# Patient Record
Sex: MALE | Race: BLACK OR AFRICAN AMERICAN | NOT HISPANIC OR LATINO | Employment: FULL TIME | URBAN - METROPOLITAN AREA
[De-identification: names, ages, dates, MRNs, and addresses within clinical notes are randomized per-mention and may not be internally consistent; named-entity substitution may affect disease eponyms.]

---

## 2017-03-27 ENCOUNTER — OFFICE VISIT (OUTPATIENT)
Dept: INTERNAL MEDICINE | Facility: CLINIC | Age: 32
End: 2017-03-27
Payer: OTHER GOVERNMENT

## 2017-03-27 VITALS
HEIGHT: 75 IN | BODY MASS INDEX: 30.59 KG/M2 | HEART RATE: 48 BPM | DIASTOLIC BLOOD PRESSURE: 82 MMHG | SYSTOLIC BLOOD PRESSURE: 130 MMHG | OXYGEN SATURATION: 99 % | WEIGHT: 246.06 LBS | TEMPERATURE: 98 F

## 2017-03-27 DIAGNOSIS — G47.33 OSA ON CPAP: ICD-10-CM

## 2017-03-27 DIAGNOSIS — H51.8 DYSCONJUGATE GAZE: ICD-10-CM

## 2017-03-27 DIAGNOSIS — R53.82 CHRONIC FATIGUE: ICD-10-CM

## 2017-03-27 DIAGNOSIS — Z00.00 ROUTINE MEDICAL EXAM: Primary | ICD-10-CM

## 2017-03-27 PROCEDURE — 99385 PREV VISIT NEW AGE 18-39: CPT | Mod: S$PBB,,, | Performed by: INTERNAL MEDICINE

## 2017-03-27 PROCEDURE — 99999 PR PBB SHADOW E&M-NEW PATIENT-LVL IV: CPT | Mod: PBBFAC,,, | Performed by: INTERNAL MEDICINE

## 2017-03-27 PROCEDURE — 99204 OFFICE O/P NEW MOD 45 MIN: CPT | Mod: PBBFAC | Performed by: INTERNAL MEDICINE

## 2017-03-27 RX ORDER — SILDENAFIL 100 MG/1
100 TABLET, FILM COATED ORAL DAILY PRN
COMMUNITY

## 2017-03-27 RX ORDER — CELECOXIB 100 MG/1
100 CAPSULE ORAL 2 TIMES DAILY
COMMUNITY

## 2017-03-27 RX ORDER — VALACYCLOVIR HYDROCHLORIDE 500 MG/1
500 TABLET, FILM COATED ORAL 2 TIMES DAILY
COMMUNITY

## 2017-03-27 NOTE — MR AVS SNAPSHOT
"    [facility]  [address]  [place] [address]  Phone: [phone]  Fax: [phone]                  Omar Garza   3/27/2017 8:30 AM   Office Visit    Description:  Male : 1985   Provider:  Angela Jesus MD   Department:  [facility]           Reason for Visit     Establish Care           Diagnoses this Visit        Comments    Routine medical exam    -  Primary     Dysconjugate gaze         KANDIS on CPAP         Chronic fatigue                To Do List           Goals (5 Years of Data)     None      Follow-Up and Disposition     Return if symptoms worsen or fail to improve.      [facility]     [facility] Care [facility] -  Assistance  Registered nurses in the [facility] provide clinical advisement, health education, appointment booking, and other advisory services.  Call for this free service at [phone].             Medications                Verify that the below list of medications is an accurate representation of the medications you are currently taking.  If none reported, the list may be blank. If incorrect, please contact your healthcare provider. Carry this list with you in case of emergency.           Current Medications     celecoxib (CELEBREX) 100 MG capsule Take 100 mg by mouth 2 (two) times daily.    sildenafil (VIAGRA) 100 MG tablet Take 100 mg by mouth daily as needed for Erectile Dysfunction.    valacyclovir (VALTREX) 500 MG tablet Take 500 mg by mouth 2 (two) times daily.           Clinical Reference Information           Your Vitals Were     BP Pulse Temp Height Weight SpO2    130/82 (BP Location: Right arm, Patient Position: Sitting, BP Method: Manual) 48 97.5 °F (36.4 °C) (Oral) 6' 3" (1.905 m) 111.6 kg (246 lb 0.5 oz) 99%    BMI                30.75 kg/m2          Blood Pressure          Most Recent Value    BP  130/82      Allergies as of 3/27/2017     No Known Allergies      Immunizations " Administered on Date of Encounter - 3/27/2017     None      Orders Placed During Today's Visit      Normal Orders This Visit    Ambulatory consult to Sleep Disorders     Ambulatory Referral to Optometry       [facility] Sign-Up     Activating your [facility] account is as easy as 1-2-3!     1) Visit [email or web address], select Sign Up Now, enter this activation code and your date of birth, then select Next.  [email or web address]  Expires: 5/11/2017  9:39 AM      2) Create a username and password to use when you visit [facility] in the future and select a security question in case you lose your password and select Next.    3) Enter your e-mail address and click Sign Up!    Additional Information  If you have questions, please e-mail [email or web address] or call [phone] to talk to our [facility] staff. Remember, [facility] is NOT to be used for urgent needs. For medical emergencies, dial 911.         Language Assistance Services     ATTENTION: Language assistance services are available, free of charge. Please call [phone].      ATENCIÓN: Si habla español, tiene a palmer disposición servicios gratuitos de asistencia lingüística. Llame al [phone].     CHÚ Ý: N?u b?n nói Ti?ng Vi?t, có các d?ch v? h? tr? ngôn ng? mi?n phí dành cho b?n. G?i s? [phone].         [facility] complies with applicable Federal civil rights laws and does not discriminate on the basis of race, color, national origin, age, disability, or sex.

## 2017-03-28 PROBLEM — M25.9 SHOULDER PROBLEM: Status: ACTIVE | Noted: 2017-03-28

## 2017-03-28 NOTE — PROGRESS NOTES
"Subjective:       Patient ID: Omar Garza is a 32 y.o. male.    Chief Complaint: Establish Care    HPI Comments: [facility] is new to the clinic[facility] Presents to establish care, to make sure [facility] is [facility], but didn't bring any records. Fairly uncomplicated case, no diagnostic dilemmas. Just a variety of musculoskeletal problems stemming from ten years of service in the [employer]. No acute complaints.     PM/SH:   KANDIS on CPAP.   Bilateral Shoulder problems s/p surgery on right shoulder twice "failed".   Right Carpal Tunnel Syndrome.   Bilateral Cubital Tunnel Syndrome.   Right 4th Finger Fracture, no surgery required.   Erectile Dysfunction with history of groin injury.  Eye Surgery as a child.   Severe Tinea Pedis treated by Podiatry.   H/O Morbid Obesity, max weight 310 lbs.  Oral Herpes Simplex.   Eye exam 2016. Flu shot 2016. Labs 1/17 including Cholesterol.     Social: Non-smoker, alcohol about one drink per month.  8 years, 3 children. Just retired from the [employer] 12/16, currently unemployed. High school graduate.     FMH: HTN in mother. HTN, DM and Heart dis in grandparents. Breast cancer in maternal aunt. Lung cancer in maternal uncle.     NKDA.    Medications: Celebrex 100mg BID. Valtrex 500mg daily. Viagra 100mg prn. TENS unit. Multivitamin.       Review of Systems   Constitutional: Positive for fatigue. Negative for activity change, appetite change, chills, fever and unexpected weight change.        Compliant with nightly use of CPAP and still has chronic fatigue with daytime somnolence. Exercises regularly, runs 8-10 miles five days a week. Minimal caffeine, one soda daily.    HENT: Negative for congestion, ear pain, hearing loss, postnasal drip, rhinorrhea, sore throat, trouble swallowing and voice change.    Eyes: Negative for pain, itching and visual disturbance.        Dysconjugate gaze since childhood.    Respiratory: Positive for apnea. Negative for cough, " "chest tightness, shortness of breath and wheezing.    Cardiovascular: Negative for chest pain, palpitations and leg swelling.   Gastrointestinal: Negative for abdominal pain, blood in stool, constipation, diarrhea, nausea and vomiting.   Genitourinary: Negative for difficulty urinating, dysuria, frequency, hematuria, scrotal swelling and urgency.   Musculoskeletal: Positive for arthralgias. Negative for back pain, gait problem, myalgias, neck pain and neck stiffness.        Joint pain in shoulders, elbows, knees.    Skin: Negative for color change.        Complains of acne on body, [facility]    Neurological: Positive for numbness. Negative for dizziness, seizures, syncope, weakness and headaches.   Hematological: Negative for adenopathy. Does not bruise/bleed easily.   Psychiatric/Behavioral: Negative for agitation, decreased concentration, dysphoric mood and suicidal ideas. The patient is not nervous/anxious.        Objective:    /82, Pulse 48-60, Ht 6' 3", Wt 246 lbs, BMI=30.75.  Physical Exam   Constitutional: He is oriented to person, place, and time. He appears well-developed and well-nourished. No distress.   HENT:   Head: Normocephalic and atraumatic.   Right Ear: External ear normal.   Left Ear: External ear normal.   Nose: Nose normal.   Mouth/Throat: Oropharynx is clear and moist.   Eyes: Conjunctivae are normal. Pupils are equal, round, and reactive to light. No scleral icterus.   Eyes move together but right eye seems to drift laterally at rest.    Neck: Normal range of motion. Neck supple. No JVD present. No thyromegaly present.   Cardiovascular: Normal rate, regular rhythm, normal heart sounds and intact distal pulses.  Exam reveals no gallop and no friction rub.    No murmur heard.  Pulmonary/Chest: Effort normal and breath sounds normal. No respiratory distress. He has no wheezes. He has no rales.   Abdominal: Soft. Bowel sounds are normal. He exhibits no " distension and no mass. There is no tenderness. No hernia.   Musculoskeletal: He exhibits no edema or tenderness.   Right shoulder has limited range of motion. Right 4th finger healed crooked.    Lymphadenopathy:     He has no cervical adenopathy.   Neurological: He is alert and oriented to person, place, and time. No cranial nerve deficit. He exhibits normal muscle tone. Coordination normal.   Skin: Skin is warm and dry. No rash noted. He is not diaphoretic.   Psychiatric: He has a normal mood and affect. His behavior is normal. Thought content normal.       Assessment:       1. Routine medical exam    2. Dysconjugate gaze    3. KANDIS on CPAP    4. Chronic fatigue        Plan:       Routine medical exam    Dysconjugate gaze  -     Ambulatory Referral to Optometry    KANDIS on CPAP  -     Ambulatory consult to Sleep Disorders    Chronic fatigue  -     Ambulatory consult to Sleep Disorders    Various musculoskeletal problems, patient is not requesting Ortho consult here[facility]   Requested to [facility] prior to repeating any here.   Return as needed.

## 2017-03-29 ENCOUNTER — PATIENT MESSAGE (OUTPATIENT)
Dept: INTERNAL MEDICINE | Facility: CLINIC | Age: 32
End: 2017-03-29

## 2017-04-03 ENCOUNTER — OFFICE VISIT (OUTPATIENT)
Dept: OPTOMETRY | Facility: CLINIC | Age: 32
End: 2017-04-03
Payer: OTHER GOVERNMENT

## 2017-04-03 DIAGNOSIS — H52.13 BILATERAL MYOPIA: ICD-10-CM

## 2017-04-03 DIAGNOSIS — H50.111 EXOTROPIA OF RIGHT EYE: Primary | ICD-10-CM

## 2017-04-03 PROCEDURE — 99999 PR PBB SHADOW E&M-EST. PATIENT-LVL III: CPT | Mod: PBBFAC,,, | Performed by: OPTOMETRIST

## 2017-04-03 PROCEDURE — 92004 COMPRE OPH EXAM NEW PT 1/>: CPT | Mod: S$PBB,,, | Performed by: OPTOMETRIST

## 2017-04-03 PROCEDURE — 99213 OFFICE O/P EST LOW 20 MIN: CPT | Mod: PBBFAC | Performed by: OPTOMETRIST

## 2017-04-03 PROCEDURE — 92015 DETERMINE REFRACTIVE STATE: CPT | Mod: ,,, | Performed by: OPTOMETRIST

## 2017-04-03 NOTE — PATIENT INSTRUCTIONS
EXOTROPIA    Exotropia is the outward deviation (turn) of an eye. The deviation may occur while fixating (looking at) distance objects, near objects or both. Fortunately, most exotropia is intermittent (see pictures below -- straight and deviated) and this means that the eye deviation or turn occurs only some of the time. As long as the eyes are straight some of the time, the brain will develop some normal functioning of the eyes (stereoscopic depth perception). Since the brain and eyes work properly some of the time, time is on your side.     Treatment for intermittent exotropia does not have to occur immediately. As a matter of fact, early surgery has the potential of disturbing the ability of the brain for fusion in the future and can cause a permanent reduction in vision (amblyopia).     When the turn occurs during distance viewing the major problem is cosmetic. The child might be accused of daydreaming or not paying attention. The parents will often notice the turn then bring their child to the eye doctor who won't find it. This is because the turn usually occurs during times of inattention, fatigue, or distance viewing not during the anxiety-provoking eye examination. These children often close their eye in bright sunlight.     Treatment consists of vision therapy, patching, glasses and/or surgery. The most successful form of treatment is vision therapy. Therapy should be directed at the cause. In a comparative study using both Optometric and Ophthalmological journals, vision therapy had an overall success rate of 78% as compared to surgery of 48%. Thus, surgery should be reserved only for the large angle intermittent exotropes or when vision therapy is not as successful as expected.     Published success rates are higher for vision therapy than surgery. Many surgeons do not advocate vision therapy either because they are surgeons and thus like to operate and/or they do not have the facilities to perform  vision therapy, and/or have not read the medical literature on vision therapy and unaware of its proven success rates in the treatment of exotropia.       When the turn occurs primarily at near (convergence insufficiency) the principal symptoms include diplopia, headaches, loss of concentration while reading, carsickness, avoidance of reading, blurred vision, and/or eyestrain. This is the most common type of type of muscle problem occurring in approximately 5% of the population. Symptoms are on the rise with increase reading and computer use.     Prismatic glasses can be prescribed to decrease some of the symptoms. Though prisms are effective they are not as effective as vision therapy and may result in adaptation problems so that more prism is necessary in the future to alleviate symptoms. Every Optometric and Ophthalmological textbook agrees that the primary treatment of convergence insufficiency should be vision therapy.    Lina Mead, OD

## 2017-04-03 NOTE — PROGRESS NOTES
HPI     Mr. Omar Garza  referred by Angela Jesus MD for dysconjugate   gaze     Patient states that he has had strabismus since a child and was interested   in possibly getting eye muscle surgery to correct it, pt c/o blurry   distance vision in OD without glasses.    (-)drops  (-)flashes  (+)floaters OU has had since a child no recent changes in his floaters   (-)diplopia    Diabetic no  No results found for: HGBA1C    OCULAR HISTORY  [facility]   (+)eye surgery OS pt could not specify what type of surgery but state it   was a form of corrective surgery to the cornea at about age 8   + strabismus     FAMILY HISTORY  (-)Glaucoma none          Last edited by Lina Mead, OD on 4/3/2017 10:36 AM.         Assessment /Plan     For exam results, see Encounter Report.    Exotropia of right eye   No amblyopia. Pt motivated for strabismus surgery. Consult scheduled with surgeon on 04/25/17.    Bilateral myopia   New glasses prescription released, adaptation expected.    Eyeglass Final Rx     Eyeglass Final Rx      Sphere Cylinder   Right -1.00 Sphere   Left -0.25 Sphere       Type:  SVL    Expiration Date:  4/4/2018                 RTC 04/25/17 for strabismus surgery consult

## 2017-04-03 NOTE — LETTER
April 3, 2017      Angela Jesus MD  [address]  [facility] [address]           [facility]  [address]  [facility] [address]  Phone: [phone]          Patient: Omar Garza   MR Number: [id number]   YOB: 1985   Date of Visit: 4/3/2017       Dear Dr. Angela Jesus:    Thank you for referring Omar Garza to me for evaluation. Attached you will find relevant portions of my assessment and plan of care.    If you have questions, please do not hesitate to call me. I look forward to following Omar Garza along with you.    Sincerely,    Lina Mead, OD    Enclosure  CC:  No Recipients    If you would like to receive this communication electronically, please contact [email or web address] or [phone] to request more information on [employer] Link access.    For providers and/or their staff who would like to refer a patient to [facility], please contact us through our one-stop-shop provider referral line, [facility], at [phone].    If you feel you have received this communication in error or would no longer like to receive these types of communications, please e-mail [email or web address]

## 2017-04-03 NOTE — MR AVS SNAPSHOT
[facility]  [address]  [facility] [address]  Phone: [phone]                  Omar Garza   4/3/2017 10:00 AM   Office Visit    Description:  Male : 1985   Provider:  Lina Mead OD   Department:  [facility]           Reason for Visit     Eye Exam           Diagnoses this Visit        Comments    Exotropia of right eye    -  Primary     Bilateral myopia                To Do List           Future Appointments        Provider Department Dept Phone    2017 10:00 AM Dwayne Metz MD [facility] [phone]      Goals (5 Years of Data)     None      [facility]     [facility] On Call Nurse Care Line -  Assistance  Unless otherwise directed by your provider, please contact [facility], our nurse care line that is available for  assistance.     Registered nurses in the [facility] On Call Center provide: appointment scheduling, clinical advisement, health education, and other advisory services.  Call: [phone] (toll free)               Medications                Verify that the below list of medications is an accurate representation of the medications you are currently taking.  If none reported, the list may be blank. If incorrect, please contact your healthcare provider. Carry this list with you in case of emergency.           Current Medications     celecoxib (CELEBREX) 100 MG capsule Take 100 mg by mouth 2 (two) times daily.    sildenafil (VIAGRA) 100 MG tablet Take 100 mg by mouth daily as needed for Erectile Dysfunction.    valacyclovir (VALTREX) 500 MG tablet Take 500 mg by mouth 2 (two) times daily.           Clinical Reference Information           Allergies as of 4/3/2017     No Known Allergies      Immunizations Administered on Date of Encounter - 4/3/2017     None      Instructions    EXOTROPIA    Exotropia is the outward deviation (turn) of an eye. The deviation may occur while fixating (looking at) distance  objects, near objects or both. Fortunately, most exotropia is intermittent (see pictures below -- straight and deviated) and this means that the eye deviation or turn occurs only some of the time. As long as the eyes are straight some of the time, the brain will develop some normal functioning of the eyes (stereoscopic depth perception). Since the brain and eyes work properly some of the time, time is on your side.     Treatment for intermittent exotropia does not have to occur immediately. As a matter of fact, early surgery has the potential of disturbing the ability of the brain for fusion in the future and can cause a permanent reduction in vision (amblyopia).     When the turn occurs during distance viewing the major problem is cosmetic. The child might be accused of daydreaming or not paying attention. The parents will often notice the turn then bring their child to the eye doctor who won't find it. This is because the turn usually occurs during times of inattention, fatigue, or distance viewing not during the anxiety-provoking eye examination. These children often close their eye in bright sunlight.     Treatment consists of vision therapy, patching, glasses and/or surgery. The most successful form of treatment is vision therapy. Therapy should be directed at the cause. In a comparative study using both Optometric and Ophthalmological journals, vision therapy had an overall success rate of 78% as compared to surgery of 48%. Thus, surgery should be reserved only for the large angle intermittent exotropes or when vision therapy is not as successful as expected.     Published success rates are higher for vision therapy than surgery. Many surgeons do not advocate vision therapy either because they are surgeons and thus like to operate and/or they do not have the facilities to perform vision therapy, and/or have not read the medical literature on vision therapy and unaware of its proven success rates in the  treatment of exotropia.       When the turn occurs primarily at near (convergence insufficiency) the principal symptoms include diplopia, headaches, loss of concentration while reading, carsickness, avoidance of reading, blurred vision, and/or eyestrain. This is the most common type of type of muscle problem occurring in approximately 5% of the population. Symptoms are on the rise with increase reading and computer use.     Prismatic glasses can be prescribed to decrease some of the symptoms. Though prisms are effective they are not as effective as vision therapy and may result in adaptation problems so that more prism is necessary in the future to alleviate symptoms. Every Optometric and Ophthalmological textbook agrees that the primary treatment of convergence insufficiency should be vision therapy.    Lina Mead OD        Language Assistance Services     ATTENTION: Language assistance services are available, free of charge. Please call [phone].      ATENCIÓN: Si mickey robertson, tiene a palmer disposición servicios gratuitos de asistencia lingüística. Llame al [phone].     SARAH Ý: N?u b?n nói Ti?ng Vi?t, có các d?ch v? h? tr? ngôn ng? mi?n phí dành cho b?n. G?i s? [phone].         [facility] complies with applicable Federal civil rights laws and does not discriminate on the basis of race, color, national origin, age, disability, or sex.

## 2017-04-25 ENCOUNTER — INITIAL CONSULT (OUTPATIENT)
Dept: OPHTHALMOLOGY | Facility: CLINIC | Age: 32
End: 2017-04-25
Payer: OTHER GOVERNMENT

## 2017-04-25 DIAGNOSIS — H50.10 EXOTROPIA: Primary | ICD-10-CM

## 2017-04-25 DIAGNOSIS — H50.22 HYPERTROPIA OF LEFT EYE: ICD-10-CM

## 2017-04-25 PROCEDURE — 99212 OFFICE O/P EST SF 10 MIN: CPT | Mod: PBBFAC | Performed by: OPHTHALMOLOGY

## 2017-04-25 PROCEDURE — 92060 SENSORIMOTOR EXAMINATION: CPT | Mod: 26,S$PBB,, | Performed by: OPHTHALMOLOGY

## 2017-04-25 PROCEDURE — 92060 SENSORIMOTOR EXAMINATION: CPT | Mod: PBBFAC | Performed by: OPHTHALMOLOGY

## 2017-04-25 PROCEDURE — 99999 PR PBB SHADOW E&M-EST. PATIENT-LVL II: CPT | Mod: PBBFAC,,, | Performed by: OPHTHALMOLOGY

## 2017-04-25 PROCEDURE — 92002 INTRM OPH EXAM NEW PATIENT: CPT | Mod: S$PBB,,, | Performed by: OPHTHALMOLOGY

## 2017-04-25 NOTE — LETTER
April 25, 2017      Lina Mead, OD  [address]  [facility] [address]           [facility]  [address]  [facility] [address]  Phone: [phone]  Fax: [phone]          Patient: Omar Garza   MR Number: [id number]   YOB: 1985   Date of Visit: 4/25/2017       Dear Dr. Lina Mead:    Thank you for referring Omar Garza to me for evaluation. Attached you will find relevant portions of my assessment and plan of care.    If you have questions, please do not hesitate to call me. I look forward to following Omar Garza along with you.    Sincerely,    BERNADETTE Capone Jr., MD    Enclosure  CC:  No Recipients    If you would like to receive this communication electronically, please contact [email or web address] or [phone] to request more information on [employer] Link access.    For providers and/or their staff who would like to refer a patient to [facility], please contact us through our one-stop-shop provider referral line, [facility], at [phone].    If you feel you have received this communication in error or would no longer like to receive these types of communications, please e-mail [email or web address]

## 2017-04-25 NOTE — PROGRESS NOTES
HPI     31 Y/O M presents today for Strabismus Eval. Pt has a h/o of CE due to   having to restore sight in the left eye at age 7 or 8. Today pt report OD>   OS and my focusing of my right to be worse. stj     POHx:   1. H/O Eye Sx OS (Cataract)        Last edited by Daksha Loving MA on 4/25/2017 11:32 AM.     ROS     Positive for: Eyes    Last edited by BERNADETTE Capone Jr., MD on 4/25/2017 11:44 AM. (History)          Assessment /Plan     For exam results, see Encounter Report.    Exotropia    Hypertropia of left eye      Consider surgical correction to correct exotropia and left hypertropia. The details of the surgical procedure were discussed. The risks of the procedure were identified and explained. Treatment alternatives were listed.    Advised 95% success rate with 5% chance need to repeat surgery, use of adjustable suture explained to ensure a better out come.  Procedure plan will be R&R OS w/IO recession

## 2017-04-25 NOTE — MR AVS SNAPSHOT
[facility]  [address]  [facility] [address]  Phone: [phone]  Fax: [phone]                  Omar Garza   2017 11:30 AM   Initial consult    Description:  Male : 1985   Provider:  BERNADETTE Capone Jr., MD   Department:  [facility]           Reason for Visit     Strabismus Consult           Diagnoses this Visit        Comments    Exotropia    -  Primary     Hypertropia of left eye                To Do List           Future Appointments        Provider Department Dept Phone    2017 10:00 AM Dwayne Metz MD [facility] [phone]      Goals (5 Years of Data)     None      [facility]     [facility] Nurse Care Line -  Assistance  Unless otherwise directed by your provider, please contact [facility], our nurse care line that is available for  assistance.     Registered nurses in the [facility] Center provide: appointment scheduling, clinical advisement, health education, and other advisory services.  Call: [phone] (toll free)               Medications                Verify that the below list of medications is an accurate representation of the medications you are currently taking.  If none reported, the list may be blank. If incorrect, please contact your healthcare provider. Carry this list with you in case of emergency.           Current Medications     celecoxib (CELEBREX) 100 MG capsule Take 100 mg by mouth 2 (two) times daily.    sildenafil (VIAGRA) 100 MG tablet Take 100 mg by mouth daily as needed for Erectile Dysfunction.    valacyclovir (VALTREX) 500 MG tablet Take 500 mg by mouth 2 (two) times daily.           Clinical Reference Information           Allergies as of 2017     No Known Allergies      Immunizations Administered on Date of Encounter - 2017     None      Language Assistance Services     ATTENTION: Language assistance services are available, free of charge. Please call  [phone].      ATENCIÓN: Si habla español, tiene a palmer disposición servicios gratuitos de asistencia lingüística. Llame al [phone].     CHÚ Ý: N?u b?n nói Ti?ng Vi?t, có các d?ch v? h? tr? ngôn ng? mi?n phí dành cho b?n. G?i s? [phone].         [facility] complies with applicable Federal civil rights laws and does not discriminate on the basis of race, color, national origin, age, disability, or sex.

## 2017-04-27 ENCOUNTER — TELEPHONE (OUTPATIENT)
Dept: OPHTHALMOLOGY | Facility: CLINIC | Age: 32
End: 2017-04-27

## 2017-04-27 DIAGNOSIS — H50.10 EXOTROPIA: Primary | ICD-10-CM

## 2017-04-27 DIAGNOSIS — H50.22 HYPERTROPIA OF LEFT EYE: ICD-10-CM

## 2017-05-11 ENCOUNTER — TELEPHONE (OUTPATIENT)
Dept: OPHTHALMOLOGY | Facility: CLINIC | Age: 32
End: 2017-05-11

## 2017-05-11 NOTE — TELEPHONE ENCOUNTER
----- Message from Daksha Loving MA sent at 5/10/2017  4:08 PM CDT -----  Contact: Pt  Liliam Patterson, I did speak with pt regarding Sx. Please call him to verify clearance is good to go. Thanks Daksha  ----- Message -----     From: Zahida Shelley     Sent: 5/10/2017   3:50 PM       To: [facility] Staff    Pt would like to know if his primary care doctor has signed the pre-approval letter for his upcoming surgery. He can be reached at [phone]      Phoned pt. Advised I do not have medical clearance from his PCP. Pt will call office.  [facility]

## 2017-05-12 ENCOUNTER — TELEPHONE (OUTPATIENT)
Dept: INTERNAL MEDICINE | Facility: CLINIC | Age: 32
End: 2017-05-12

## 2017-05-12 NOTE — TELEPHONE ENCOUNTER
----- Message from Domenica Harris sent at 5/11/2017  4:01 PM CDT -----  Contact: Self/ [phone]  Patient is calling to check status of the paper work left in the office clearing him for surgery. Please call and advise.             Thank you!!!

## 2017-05-12 NOTE — TELEPHONE ENCOUNTER
Seen for a physical six weeks ago with no major medical problems and normal exam. Cleared for low risk eye surgery, no additional pre-op testing needed.

## 2017-05-12 NOTE — TELEPHONE ENCOUNTER
Please advise can pt be cleared by chart for surgical correction of the eyes pl adv.   Consider surgical correction to correct exotropia and left hypertropia.

## 2017-05-16 ENCOUNTER — TELEPHONE (OUTPATIENT)
Dept: OPHTHALMOLOGY | Facility: CLINIC | Age: 32
End: 2017-05-16

## 2017-05-16 ENCOUNTER — ANESTHESIA EVENT (OUTPATIENT)
Dept: SURGERY | Facility: HOSPITAL | Age: 32
End: 2017-05-16
Payer: OTHER GOVERNMENT

## 2017-05-16 NOTE — ANESTHESIA PREPROCEDURE EVALUATION
05/16/2017  Pre-operative evaluation for Procedure(s) (LRB):  STRABISMUS REPAIR/with adjustable sutures (Bilateral)    Omar Garza is a 32 y.o. male here for the above procedure.        Patient Active Problem List   Diagnosis    KANDIS on CPAP    Carpal tunnel syndrome on right    Cubital tunnel syndrome, bilateral    Shoulder problem    Exotropia    Hypertropia of left eye       Review of patient's allergies indicates:  No Known Allergies     No current facility-administered medications on file prior to encounter.      Current Outpatient Prescriptions on File Prior to Encounter   Medication Sig Dispense Refill    celecoxib (CELEBREX) 100 MG capsule Take 100 mg by mouth 2 (two) times daily.      sildenafil (VIAGRA) 100 MG tablet Take 100 mg by mouth daily as needed for Erectile Dysfunction.      valacyclovir (VALTREX) 500 MG tablet Take 500 mg by mouth 2 (two) times daily.         Past Surgical History:   Procedure Laterality Date    CATARACT EXTRACTION      EYE SURGERY      SHOULDER SURGERY Right        Social History     Social History    Marital status:      Spouse name: N/A    Number of children: 3    Years of education: N/A     Occupational History    Not on file.     Social History Main Topics    Smoking status: Never Smoker    Smokeless tobacco: Not on file    Alcohol use Yes      Comment: One a month.     Drug use: No    Sexual activity: Not on file     Other Topics Concern    Not on file     Social History Narrative    No narrative on file         Vital Signs Range (Last 24H):         CBC: No results for input(s): WBC, RBC, HGB, HCT, PLT, MCV, MCH, MCHC in the last 72 hours.    CMP: No results for input(s): NA, K, CL, CO2, BUN, CREATININE, GLU, MG, PHOS, CALCIUM, ALBUMIN, PROT, ALKPHOS, ALT, AST, BILITOT in the last 72 hours.    INR  No results for input(s): INR, PROTIME,  APTT in the last 72 hours.    Invalid input(s): PT      Anesthesia Evaluation    I have reviewed the Patient Summary Reports.    I have reviewed the Nursing Notes.   I have reviewed the Medications.     Review of Systems  Anesthesia Hx:  No problems with previous Anesthesia  History of prior surgery of interest to airway management or planning: Denies Family Hx of Anesthesia complications.   Denies Personal Hx of Anesthesia complications.   Hematology/Oncology:  Hematology Normal   Oncology Normal     EENT/Dental:EENT/Dental Normal   Renal/:  Renal/ Normal     Hepatic/GI:  Hepatic/GI Normal    Endocrine:  Endocrine Normal    Dermatological:  Skin Normal    Psych:  Psychiatric Normal           Physical Exam  General:  Well nourished    Airway/Jaw/Neck:  Airway Findings: Mouth Opening: Normal Tongue: Normal  General Airway Assessment: Adult  Mallampati: II  Improves to I with phonation.  TM Distance: Normal, at least 6 cm     Eyes/Ears/Nose:  EYES/EARS/NOSE FINDINGS: Normal   Dental:  DENTAL FINDINGS: Normal   Chest/Lungs:  Chest/Lungs Clear    Heart/Vascular:  Heart Findings: Normal       Mental Status:  Mental Status Findings: Normal        Anesthesia Plan  Type of Anesthesia, risks & benefits discussed:  Anesthesia Type:  general  Patient's Preference:   Intra-op Monitoring Plan: standard ASA monitors  Intra-op Monitoring Plan Comments:   Post Op Pain Control Plan:   Post Op Pain Control Plan Comments:   Induction:   IV  Beta Blocker:  Patient is not currently on a Beta-Blocker (No further documentation required).       Informed Consent: Patient understands risks and agrees with Anesthesia plan.  Questions answered. Anesthesia consent signed with patient.  ASA Score: 2     Day of Surgery Review of History & Physical:            Ready For Surgery From Anesthesia Perspective.

## 2017-05-16 NOTE — BRIEF OP NOTE
Brief Operative Note  Ophthalmology Service      Date of Procedure: (Not on file)     Attending Physician: BERNADETTE Capone Jr., MD     Assistant: NAZ Mendes MD    Pre-Operative Diagnosis: Exotropia [H50.10]  Hypertropia of left eye [H50.22]     Post-Operative Diagnosis: Same as pre-operative diagnosis    Treatments/Procedures: explore LR OUj; Biased resection/advance MR 4-6 mm; MARK myectomy    Intraoperative Findings: nl EOM's    Anesthesia: General    Complications: None    Estimated Blood Loss: < 5 cc    Specimens: None    -------------------------------------------------------------  Full dictated Operative Report to follow.  -------------------------------------------------------------

## 2017-05-17 ENCOUNTER — ANESTHESIA (OUTPATIENT)
Dept: SURGERY | Facility: HOSPITAL | Age: 32
End: 2017-05-17
Payer: OTHER GOVERNMENT

## 2017-05-17 ENCOUNTER — HOSPITAL ENCOUNTER (OUTPATIENT)
Facility: HOSPITAL | Age: 32
Discharge: HOME | End: 2017-05-17
Attending: OPHTHALMOLOGY | Admitting: OPHTHALMOLOGY
Payer: OTHER GOVERNMENT

## 2017-05-17 VITALS
BODY MASS INDEX: 29.84 KG/M2 | OXYGEN SATURATION: 100 % | HEART RATE: 45 BPM | SYSTOLIC BLOOD PRESSURE: 110 MMHG | RESPIRATION RATE: 18 BRPM | TEMPERATURE: 98 F | DIASTOLIC BLOOD PRESSURE: 62 MMHG | WEIGHT: 240 LBS | HEIGHT: 75 IN

## 2017-05-17 DIAGNOSIS — H50.22 HYPERTROPIA OF LEFT EYE: ICD-10-CM

## 2017-05-17 DIAGNOSIS — H50.10 EXOTROPIA: ICD-10-CM

## 2017-05-17 DIAGNOSIS — H50.9 STRABISMUS: ICD-10-CM

## 2017-05-17 PROCEDURE — 25000003 PHARM REV CODE 250: Performed by: OPHTHALMOLOGY

## 2017-05-17 PROCEDURE — 67335 EYE SUTURE DURING SURGERY: CPT | Mod: LT,,, | Performed by: OPHTHALMOLOGY

## 2017-05-17 PROCEDURE — 67314 REVISE EYE MUSCLE: CPT | Mod: LT,,, | Performed by: OPHTHALMOLOGY

## 2017-05-17 PROCEDURE — 25000003 PHARM REV CODE 250: Performed by: ANESTHESIOLOGY

## 2017-05-17 PROCEDURE — 36000706: Performed by: OPHTHALMOLOGY

## 2017-05-17 PROCEDURE — 67332 REREVISE EYE MUSCLES ADD-ON: CPT | Mod: 50,,, | Performed by: OPHTHALMOLOGY

## 2017-05-17 PROCEDURE — D9220A PRA ANESTHESIA: Mod: ,,, | Performed by: ANESTHESIOLOGY

## 2017-05-17 PROCEDURE — 37000008 HC ANESTHESIA 1ST 15 MINUTES: Performed by: OPHTHALMOLOGY

## 2017-05-17 PROCEDURE — 63600175 PHARM REV CODE 636 W HCPCS: Performed by: ANESTHESIOLOGY

## 2017-05-17 PROCEDURE — 71000039 HC RECOVERY, EACH ADD'L HOUR: Performed by: OPHTHALMOLOGY

## 2017-05-17 PROCEDURE — 71000015 HC POSTOP RECOV 1ST HR: Performed by: OPHTHALMOLOGY

## 2017-05-17 PROCEDURE — 37000009 HC ANESTHESIA EA ADD 15 MINS: Performed by: OPHTHALMOLOGY

## 2017-05-17 PROCEDURE — 67311 REVISE EYE MUSCLE: CPT | Mod: 51,50,, | Performed by: OPHTHALMOLOGY

## 2017-05-17 PROCEDURE — 36000707: Performed by: OPHTHALMOLOGY

## 2017-05-17 PROCEDURE — 27200651 HC AIRWAY, LMA: Performed by: ANESTHESIOLOGY

## 2017-05-17 PROCEDURE — 71000033 HC RECOVERY, INTIAL HOUR: Performed by: OPHTHALMOLOGY

## 2017-05-17 RX ORDER — ONDANSETRON 2 MG/ML
4 INJECTION INTRAMUSCULAR; INTRAVENOUS ONCE
Status: COMPLETED | OUTPATIENT
Start: 2017-05-17 | End: 2017-05-17

## 2017-05-17 RX ORDER — HYDROCODONE BITARTRATE AND ACETAMINOPHEN 7.5; 325 MG/1; MG/1
1 TABLET ORAL EVERY 6 HOURS PRN
Qty: 10 TABLET | Refills: 0 | Status: SHIPPED | OUTPATIENT
Start: 2017-05-17

## 2017-05-17 RX ORDER — DEXAMETHASONE SODIUM PHOSPHATE 4 MG/ML
4 INJECTION, SOLUTION INTRA-ARTICULAR; INTRALESIONAL; INTRAMUSCULAR; INTRAVENOUS; SOFT TISSUE EVERY 6 HOURS
Status: DISCONTINUED | OUTPATIENT
Start: 2017-05-17 | End: 2017-05-17 | Stop reason: HOSPADM

## 2017-05-17 RX ORDER — LIDOCAINE HCL/PF 100 MG/5ML
SYRINGE (ML) INTRAVENOUS
Status: DISCONTINUED | OUTPATIENT
Start: 2017-05-17 | End: 2017-05-17

## 2017-05-17 RX ORDER — NEOMYCIN SULFATE, POLYMYXIN B SULFATE, AND DEXAMETHASONE 3.5; 10000; 1 MG/G; [USP'U]/G; MG/G
OINTMENT OPHTHALMIC
Status: DISCONTINUED
Start: 2017-05-17 | End: 2017-05-17 | Stop reason: HOSPADM

## 2017-05-17 RX ORDER — HYDROCODONE BITARTRATE AND ACETAMINOPHEN 5; 325 MG/1; MG/1
1 TABLET ORAL ONCE
Status: COMPLETED | OUTPATIENT
Start: 2017-05-17 | End: 2017-05-17

## 2017-05-17 RX ORDER — SODIUM CHLORIDE 9 MG/ML
INJECTION, SOLUTION INTRAVENOUS CONTINUOUS
Status: DISCONTINUED | OUTPATIENT
Start: 2017-05-17 | End: 2017-05-17 | Stop reason: HOSPADM

## 2017-05-17 RX ORDER — FENTANYL CITRATE 50 UG/ML
INJECTION, SOLUTION INTRAMUSCULAR; INTRAVENOUS
Status: DISCONTINUED | OUTPATIENT
Start: 2017-05-17 | End: 2017-05-17

## 2017-05-17 RX ORDER — ONDANSETRON 4 MG/1
4 TABLET, ORALLY DISINTEGRATING ORAL EVERY 6 HOURS PRN
Status: DISCONTINUED | OUTPATIENT
Start: 2017-05-17 | End: 2017-05-17 | Stop reason: HOSPADM

## 2017-05-17 RX ORDER — LIDOCAINE HYDROCHLORIDE 10 MG/ML
1 INJECTION, SOLUTION EPIDURAL; INFILTRATION; INTRACAUDAL; PERINEURAL ONCE
Status: COMPLETED | OUTPATIENT
Start: 2017-05-17 | End: 2017-05-17

## 2017-05-17 RX ORDER — NEOMYCIN SULFATE, POLYMYXIN B SULFATE, AND DEXAMETHASONE 3.5; 10000; 1 MG/G; [USP'U]/G; MG/G
OINTMENT OPHTHALMIC
Status: DISCONTINUED | OUTPATIENT
Start: 2017-05-17 | End: 2017-05-17 | Stop reason: HOSPADM

## 2017-05-17 RX ORDER — GLYCOPYRROLATE 0.2 MG/ML
INJECTION INTRAMUSCULAR; INTRAVENOUS
Status: DISCONTINUED | OUTPATIENT
Start: 2017-05-17 | End: 2017-05-17

## 2017-05-17 RX ORDER — HYDROCODONE BITARTRATE AND ACETAMINOPHEN 5; 325 MG/1; MG/1
1 TABLET ORAL EVERY 6 HOURS PRN
Status: DISCONTINUED | OUTPATIENT
Start: 2017-05-17 | End: 2017-05-17 | Stop reason: HOSPADM

## 2017-05-17 RX ORDER — PHENYLEPHRINE HYDROCHLORIDE 25 MG/ML
SOLUTION/ DROPS OPHTHALMIC
Status: DISCONTINUED
Start: 2017-05-17 | End: 2017-05-17 | Stop reason: HOSPADM

## 2017-05-17 RX ORDER — MIDAZOLAM HYDROCHLORIDE 1 MG/ML
INJECTION, SOLUTION INTRAMUSCULAR; INTRAVENOUS
Status: DISCONTINUED | OUTPATIENT
Start: 2017-05-17 | End: 2017-05-17

## 2017-05-17 RX ORDER — PHENYLEPHRINE HYDROCHLORIDE 25 MG/ML
SOLUTION/ DROPS OPHTHALMIC
Status: DISCONTINUED | OUTPATIENT
Start: 2017-05-17 | End: 2017-05-17 | Stop reason: HOSPADM

## 2017-05-17 RX ORDER — PROPOFOL 10 MG/ML
INJECTION, EMULSION INTRAVENOUS
Status: DISCONTINUED | OUTPATIENT
Start: 2017-05-17 | End: 2017-05-17

## 2017-05-17 RX ADMIN — LIDOCAINE HYDROCHLORIDE 1 MG: 10 INJECTION, SOLUTION EPIDURAL; INFILTRATION; INTRACAUDAL; PERINEURAL at 07:05

## 2017-05-17 RX ADMIN — GLYCOPYRROLATE 0.2 MG: 0.2 INJECTION, SOLUTION INTRAMUSCULAR; INTRAVENOUS at 08:05

## 2017-05-17 RX ADMIN — SODIUM CHLORIDE: 0.9 INJECTION, SOLUTION INTRAVENOUS at 07:05

## 2017-05-17 RX ADMIN — FENTANYL CITRATE 100 MCG: 50 INJECTION, SOLUTION INTRAMUSCULAR; INTRAVENOUS at 08:05

## 2017-05-17 RX ADMIN — DEXAMETHASONE SODIUM PHOSPHATE 4 MG: 4 INJECTION, SOLUTION INTRAMUSCULAR; INTRAVENOUS at 10:05

## 2017-05-17 RX ADMIN — MIDAZOLAM HYDROCHLORIDE 2 MG: 1 INJECTION, SOLUTION INTRAMUSCULAR; INTRAVENOUS at 07:05

## 2017-05-17 RX ADMIN — LIDOCAINE HYDROCHLORIDE 100 MG: 20 INJECTION, SOLUTION INTRAVENOUS at 08:05

## 2017-05-17 RX ADMIN — HYDROCODONE BITARTRATE AND ACETAMINOPHEN 1 TABLET: 5; 325 TABLET ORAL at 09:05

## 2017-05-17 RX ADMIN — ONDANSETRON 4 MG: 2 INJECTION INTRAMUSCULAR; INTRAVENOUS at 10:05

## 2017-05-17 RX ADMIN — PROPOFOL 200 MG: 10 INJECTION, EMULSION INTRAVENOUS at 08:05

## 2017-05-17 NOTE — ANESTHESIA RELEASE NOTE
"Anesthesia Release from PACU Note    Patient: Omar Garza    Procedure(s) Performed: Procedure(s) (LRB):  STRABISMUS REPAIR/with adjustable sutures (Bilateral)    Anesthesia type: general    Post pain: Adequate analgesia    Post assessment: no apparent anesthetic complications    Last Vitals:   Visit Vitals    /62    Pulse (!) 45    Temp 36.7 °C (98.1 °F)    Resp 18    Ht 6' 3" (1.905 m)    Wt 108.9 kg (240 lb)    SpO2 100%    BMI 30 kg/m2       Post vital signs: stable    Level of consciousness: awake    Nausea/Vomiting: no nausea/no vomiting    Complications: none    Airway Patency: patent    Respiratory: unassisted    Cardiovascular: stable    Hydration: euvolemic  "

## 2017-05-17 NOTE — DISCHARGE INSTRUCTIONS
ACTIVITY LEVEL:  If you received sedation or an anesthetic, you may feel sleepy for several hours. Rest until you are more awake. Gradually resume your normal activities in two days. Children may return to school in 2-3 days. It is all right to watch television or to read. Swimming is permitted in two weeks.    CARE OF INCISION:  A blood-tinged discharge from the eye is normal. This can be gently washed away with a clean, damp wash cloth. Do not use water, gauze, or cotton to wipe the lids. The morning after surgery, you may have difficulty opening your eyes. This is normal. If dry blood or secretions are holding the lids together, you may open the eyes by gently  the lids from above and below. Please wash your hands thoroughly before doing this. If the lids dont open, do not force them apart. (A child will cry and the tears will soften the secretions and a parent can try again later.) Use cool compresses to the eyes for 24 hours, if tolerated for comfort. Do not place any medication in the eye unless otherwise instructed.    BATHING:  Keep your face out of water for five days after surgery - NO SHOWERS.    DIET:  At home, continue with liquids, and if there is no nausea, you may eat a soft diet. Gradually resume your  normal diet.    PAIN:  If needed for discomfort, you can use cold compresses and take Tylenol (usual recommended dose) every four  hours. Generally, do not take Tylenol more than four times a day.    WHEN TO CALL THE DOCTOR:   Any increase in the amount of swelling of the eyes and adjacent tissues   Heavy yellow discharge from the eyes   Fever over 101ºF (38.4ºC)    A purple discoloration of the lower lids is common. It appears a few days after surgery and does not affect healing. You may experience double vision after surgery. This is normal and will disappear in a few days or weeks. Prescription glasses may be worn unless otherwise instructed. The eyes may be unusually sensitive to  light for several days. Dark sunglasses will help.    FOR EMERGENCIES:  If any unusual problems or difficulties occur, contact Dr. Capone or the resident at [phone] (page ) or at the Clinic office, [phone].

## 2017-05-17 NOTE — DISCHARGE SUMMARY
Discharge Summary  Ophthalmology Service      Admit Date: 5/17/2017    Discharge Date: 5/17/2017     Attending Physician: BERNADETTE Capone Jr., MD     Discharge Physician: NAZ Mendes MD    Discharged Condition: Good    Reason for Admission: Exotropia [H50.10]  Hypertropia of left eye [H50.22]     Treatments/Procedures: Strabismus Surgery (see dictated report for details).    Hospital Course: Stable, dictated    Consults: None    Significant Diagnostic Studies: None    Disposition: Home    Patient Instructions:   - Resume same diet as prior to surgery  - Resume activity as tolerated with no swimming for 1 week  - Apply ice packs to surgical eye(s) for 72 hours as tolerated  - Call the Ophthalmology clinic to schedule an appointment with Dr. Capone in 4 week(s).    Patient Instructions:   Current Discharge Medication List      CONTINUE these medications which have NOT CHANGED    Details   celecoxib (CELEBREX) 100 MG capsule Take 100 mg by mouth 2 (two) times daily.      valacyclovir (VALTREX) 500 MG tablet Take 500 mg by mouth 2 (two) times daily.      sildenafil (VIAGRA) 100 MG tablet Take 100 mg by mouth daily as needed for Erectile Dysfunction.             No discharge procedures on file.

## 2017-05-17 NOTE — TRANSFER OF CARE
"Anesthesia Transfer of Care Note    Patient: Omar Garza    Procedure(s) Performed: Procedure(s) (LRB):  STRABISMUS REPAIR/with adjustable sutures (Bilateral)    Patient location: PACU    Anesthesia Type: general    Transport from OR: Transported from OR on room air with adequate spontaneous ventilation    Post pain: adequate analgesia    Post assessment: no apparent anesthetic complications    Post vital signs: stable    Level of consciousness: awake and alert    Nausea/Vomiting: no nausea/vomiting    Complications: none          Last vitals:   Visit Vitals    /77 (BP Location: Left arm, Patient Position: Lying, BP Method: Automatic)    Pulse (!) 57    Temp 37 °C (98.6 °F) (Temporal)    Resp 16    Ht 6' 3" (1.905 m)    Wt 108.9 kg (240 lb)    SpO2 100%    BMI 30 kg/m2     "

## 2017-05-17 NOTE — IP AVS SNAPSHOT
[facility]  [address]  [facility] [address]  Phone: [phone]           Patient Discharge Instructions   Our goal is to set you up for success. This packet includes information on your condition, medications, and your home care.  It will help you care for yourself to prevent having to return to the hospital.     Please ask your nurse if you have any questions.      There are many details to remember when preparing to leave the hospital. Here is what you will need to do:    1. Take your medicine. If you are prescribed medications, review your Medication List on the following pages. You may have new medications to  at the pharmacy and others that you'll need to stop taking. Review the instructions for how and when to take your medications. Talk with your doctor or nurses if you are unsure of what to do.     2. Go to your follow-up appointments. Specific follow-up information is listed in the following pages. Your may be contacted by a nurse or clinical provider about future appointments. Be sure we have all of the phone numbers to reach you. Please contact your provider's office if you are unable to make an appointment.     3. Watch for warning signs. Your doctor or nurse will give you detailed warning signs to watch for and when to call for assistance. These instructions may also include educational information about your condition. If you experience any of warning signs to your health, call your doctor.           [facility]  Unless otherwise directed by your provider, please   contact [facility], our nurse care line   that is available for 24/7 assistance.     [phone] (toll-free)     Registered nurses in the [facility]   provide: appointment scheduling, clinical advisement, health education, and other advisory services.                  ** Verify the list of medication(s) below is accurate and up to date. Carry this with you in case of  emergency. If your medications have changed, please notify your healthcare provider.             Medication List      CONTINUE taking these medications        Additional Info                      celecoxib 100 MG capsule   Commonly known as:  CeleBREX   Refills:  0   Dose:  100 mg    Instructions:  Take 100 mg by mouth 2 (two) times daily.     Begin Date    AM    Noon    PM    Bedtime       sildenafil 100 MG tablet   Commonly known as:  VIAGRA   Refills:  0   Dose:  100 mg    Instructions:  Take 100 mg by mouth daily as needed for Erectile Dysfunction.     Begin Date    AM    Noon    PM    Bedtime       valacyclovir 500 MG tablet   Commonly known as:  VALTREX   Refills:  0   Dose:  500 mg    Instructions:  Take 500 mg by mouth 2 (two) times daily.     Begin Date    AM    Noon    PM    Bedtime                  Please bring to all follow up appointments:    1. A copy of your discharge instructions.  2. All medicines you are currently taking in their original bottles.  3. Identification and insurance card.    Please arrive 15 minutes ahead of scheduled appointment time.    Please call 24 hours in advance if you must reschedule your appointment and/or time.        Follow-up Information     Follow up with DUANE Capone Jr, MD In 4 weeks.    Specialties:  Ophthalmology, Pediatric Ophthalmology    Contact information:    [address] [facility] [address]  [phone]          Discharge Instructions     Future Orders    Activity as tolerated     Call MD for:  difficulty breathing or increased cough     Call MD for:  increased confusion or weakness     Call MD for:  persistent dizziness, light-headedness, or visual disturbances     Call MD for:  persistent nausea and vomiting or diarrhea     Call MD for:  redness, tenderness, or signs of infection (pain, swelling, redness, odor or green/yellow discharge around incision site)     Call MD for:  severe persistent headache     Call MD for:  severe uncontrolled pain      Call MD for:  temperature >100.4     Call MD for:  worsening rash     Diet general     Questions:    Total calories:      Fat restriction, if any:      Protein restriction, if any:      Na restriction, if any:      Fluid restriction:      Additional restrictions:      No dressing needed         Discharge Instructions       ACTIVITY LEVEL:  If you received sedation or an anesthetic, you may feel sleepy for several hours. Rest until you are more awake. Gradually resume your normal activities in two days. Children may return to school in 2-3 days. It is all right to watch television or to read. Swimming is permitted in two weeks.    CARE OF INCISION:  A blood-tinged discharge from the eye is normal. This can be gently washed away with a clean, damp wash cloth. Do not use water, gauze, or cotton to wipe the lids. The morning after surgery, you may have difficulty opening your eyes. This is normal. If dry blood or secretions are holding the lids together, you may open the eyes by gently  the lids from above and below. Please wash your hands thoroughly before doing this. If the lids dont open, do not force them apart. (A child will cry and the tears will soften the secretions and a parent can try again later.) Use cool compresses to the eyes for 24 hours, if tolerated for comfort. Do not place any medication in the eye unless otherwise instructed.    BATHING:  Keep your face out of water for five days after surgery - NO SHOWERS.    DIET:  At home, continue with liquids, and if there is no nausea, you may eat a soft diet. Gradually resume your  normal diet.    PAIN:  If needed for discomfort, you can use cold compresses and take Tylenol (usual recommended dose) every four  hours. Generally, do not take Tylenol more than four times a day.    WHEN TO CALL THE DOCTOR:   Any increase in the amount of swelling of the eyes and adjacent tissues   Heavy yellow discharge from the eyes   Fever over 101ºF  "(38.4ºC)    A purple discoloration of the lower lids is common. It appears a few days after surgery and does not affect healing. You may experience double vision after surgery. This is normal and will disappear in a few days or weeks. Prescription glasses may be worn unless otherwise instructed. The eyes may be unusually sensitive to light for several days. Dark sunglasses will help.    FOR EMERGENCIES:  If any unusual problems or difficulties occur, contact Dr. Capone or the resident at [phone] (page ) or at the Clinic office, [phone].      Admission Information     Date & Time Provider Department CSN    5/17/2017  7:03 AM BERNADETTE Capone Jr., MD [facility] [id number]      Care Providers     Provider Role Specialty Primary office phone    BERNADETTE Capone Jr., MD Attending Provider Ophthalmology [phone]    BERNADETTE Capone Jr., MD Surgeon  Ophthalmology [phone]      Your Vitals Were     BP Pulse Temp Resp Height Weight    126/71 51 98.1 °F (36.7 °C) (Temporal) 16 6' 3" (1.905 m) 108.9 kg (240 lb)    SpO2 BMI             100% 30 kg/m2         Recent Lab Values     No lab values to display.      Allergies as of 5/17/2017     No Known Allergies      Advance Directives     An advance directive is a document which, in the event you are no longer able to make decisions for yourself, tells your healthcare team what kind of treatment you do or do not want to receive, or who you would like to make those decisions for you.  If you do not currently have an advance directive, [facility] encourages you to create one.  For more information call:  [phone]), [phone] ([phone]),  or log on to [email or web address].        Language Assistance Services     ATTENTION: Language assistance services are available, free of charge. Please call [phone].      ATENCIÓN: Si habla español, tiene a palmer disposición servicios gratuitos de asistencia " lingüística. David al [phone].     SARAH Ý: N?u b?n nói Ti?ng Vi?t, có các d?ch v? h? tr? ngôn ng? mi?n phí dành cho b?n. G?i s? [phone].         [facility] complies with applicable Federal civil rights laws and does not discriminate on the basis of race, color, national origin, age, disability, or sex.

## 2017-05-17 NOTE — ANESTHESIA POSTPROCEDURE EVALUATION
"Anesthesia Post Evaluation    Patient: Omar Garza    Procedure(s) Performed: Procedure(s) (LRB):  STRABISMUS REPAIR/with adjustable sutures (Bilateral)    Final Anesthesia Type: general  Patient location during evaluation: PACU  Patient participation: Yes- Able to Participate  Level of consciousness: awake and alert  Post-procedure vital signs: reviewed and stable  Pain management: adequate  Airway patency: patent  PONV status at discharge: vomiting (controlled)  Anesthetic complications: no      Cardiovascular status: blood pressure returned to baseline  Respiratory status: unassisted  Hydration status: euvolemic  Follow-up not needed.        Visit Vitals    /62    Pulse (!) 45    Temp 36.7 °C (98.1 °F)    Resp 18    Ht 6' 3" (1.905 m)    Wt 108.9 kg (240 lb)    SpO2 100%    BMI 30 kg/m2       Pain/Sabina Score: Pain Assessment Performed: Yes (5/17/2017 11:32 AM)  Presence of Pain: denies (5/17/2017 11:32 AM)  Pain Rating Prior to Med Admin: 8 (5/17/2017  9:42 AM)  Sabina Score: 10 (5/17/2017 11:32 AM)      "

## 2017-05-17 NOTE — PLAN OF CARE
Pt and family given dc instructions and verbalized understanding.   0930- Pt still c/o pain to asif eyes. 8/10 on pain scale. Per Dr. Capone may get another hydrocodone/acetaminophen 5/325mg. Pt aaox3. Spouse at bedside.   1013- Notified Dr. Stanley, pt c/o nz. See new orders per Dr. Stanley, anesthesia. Pt aaox3. Spouse at bedside.   1028- Dr. Capone at bedside. No adjustment needed.   1135- Dr. Stanley, at bedside. Pt resting. Pt states nz subsided but not good enough to go hm. Per Dr. Stanley, let pt rest.

## 2017-05-17 NOTE — H&P
Pre-Operative History & Physical  Ophthalmology      SUBJECTIVE:     History of Present Illness:  Patient is a 32 y.o. male presents with Exotropia [H50.10]  Hypertropia of left eye [H50.22].    MEDICATIONS:   No prescriptions prior to admission.       ALLERGIES: Review of patient's allergies indicates:  No Known Allergies    PAST MEDICAL HISTORY:   Past Medical History:   Diagnosis Date    Carpal tunnel syndrome on right     Cataract     Cubital tunnel syndrome, bilateral     Erectile dysfunction     KANDIS on CPAP     Shoulder problem     Bilateral    Strabismus      PAST SURGICAL HISTORY:   Past Surgical History:   Procedure Laterality Date    CATARACT EXTRACTION      EYE SURGERY      SHOULDER SURGERY Right      PAST FAMILY HISTORY:   Family History   Problem Relation Age of Onset    Hypertension Mother     Breast cancer Maternal Aunt     Lung cancer Maternal Uncle     Hypertension Maternal Grandmother     Diabetes Maternal Grandmother     Hypertension Maternal Grandfather     Diabetes Maternal Grandfather     Heart disease Paternal Grandmother      SOCIAL HISTORY:   Social History   Substance Use Topics    Smoking status: Never Smoker    Smokeless tobacco: Not on file    Alcohol use Yes      Comment: One a month.         MENTAL STATUS: Alert    REVIEW OF SYSTEMS: Negative    OBJECTIVE:     Vital Signs (Most Recent)       Physical Exam:  General: NAD  HEENT: Strabismus, Atraumatic  Lungs: Adequate respirations  Heart: + pulses  Abdomen: Soft    ASSESSMENT/PLAN:     Patient is a 32 y.o. male with Exotropia [H50.10]  Hypertropia of left eye [H50.22].     - Plan for surgical correction    - Risks/benefits/alternatives of the procedure including, but not limited to scarring, bleeding, infection, loss or decreased vision, and/or need for possible repeat surgery discussed with the patient and family.   - Informed consent obtained prior to surgery and the patient/family voiced good understanding.

## 2017-05-17 NOTE — ANESTHESIA RELEASE NOTE
"Anesthesia Release from PACU Note    Patient: mOar Garza    Procedure(s) Performed: Procedure(s) (LRB):  STRABISMUS REPAIR/with adjustable sutures (Bilateral)    Anesthesia type: general    Post pain: Adequate analgesia    Post assessment: no apparent anesthetic complications    Last Vitals:   Visit Vitals    /69    Pulse (!) 55    Temp 36.7 °C (98.1 °F) (Temporal)    Resp 16    Ht 6' 3" (1.905 m)    Wt 108.9 kg (240 lb)    SpO2 99%    BMI 30 kg/m2       Post vital signs: stable    Level of consciousness: awake    Nausea/Vomiting: no nausea/no vomiting    Complications: none    Airway Patency: patent    Respiratory: unassisted           Cardiovascular: stable    Hydration: euvolemic  "

## 2017-05-17 NOTE — OP NOTE
DATE OF PROCEDURE:  05/17/2017.    SURGEON:  BERNADETTE Capone M.D.    ASSISTANT:  Valeriy Mendes M.D. (RES).    PREOPERATIVE DIAGNOSIS:  Strabismus - esotropia with left hypertropia;   reoperation.    POSTOPERATIVE DIAGNOSIS:  Strabismus - esotropia with left hypertropia;   reoperation.    PROCEDURES PERFORMED:  Exploration, lateral rectus, both eyes; biased resection   and advancement, medial rectus, both eyes, 4-6 mm; inferior oblique myectomy,   left eye.    COMPLICATIONS:  None.    ESTIMATED BLOOD LOSS:  Less than 2 mL.    PROCEDURE IN DETAIL:  The patient was brought to the Operating Suite where   general intubation anesthesia was achieved.  Both eyes were prepped and draped   in sterile fashion, lid speculum placed in the left eye.  Through an inferior   temporal fornix incision, the inferior oblique muscle was found and placed on a   muscle hook.  It was found to have been recessed.  The muscle was cleared of its   check ligaments and the scarring between the conjunctiva and the sclera was   sharply dissected.  The muscle was then disinserted from the globe and a 6 mm   myectomy was performed.  Through the same incision, the lateral rectus muscle   was identified and found to have been recessed to 15 mm from the limbus.  The   decision was made not to perform a lateral rectus recession.  Attention was   directed to the right eye where the lateral rectus in the right eye was found   similarly to have been recessed to 15 from the limbus.  Decision was made to do   a resection and advancement to the medial rectus of both eyes.  Through an   inferior nasal fornix incision in the right eye, the medial rectus muscle was   found to have been recessed to 9 mm from the limbus.  Scarring between the   underside of the conjunctiva and the sclera was dissected.  The muscle was   cleared of its check ligaments and a double-armed 6-0 Vicryl suture passed   through the muscle belly posterior to its insertion.  Bites were  placed 4 mm   inferiorly and 2 mm superiorly.  Lock bites were placed in the middle and upper   and lower edge of the muscle.  The muscle was disinserted from the globe and the   muscle anterior to the suture line was resected.  The muscle was then   reattached to the sclera 5.0 mm from the limbus.  The conjunctiva was   reapproximated.  Attention was directed to the left eye where a similar   procedure was done to the medial rectus muscle.  In this eye, an adjustable   suture was used.  A cinch knot was placed around the muscle suture and   positioned 2 mm anteriorly.  The muscle was allowed to retract back this amount.    Betadine solution and Maxitrol ointment were placed in the eye and the patient   was brought to the Recovery Room in good condition.      JUANA/MEETA  dd: 05/17/2017 11:13:47 (CDT)  td: 05/17/2017 12:38:45 (CDT)  Doc ID   #[id number]  Job ID #[id number]    CC:

## 2017-05-18 ENCOUNTER — TELEPHONE (OUTPATIENT)
Dept: OPHTHALMOLOGY | Facility: CLINIC | Age: 32
End: 2017-05-18

## 2017-05-18 NOTE — TELEPHONE ENCOUNTER
05/18/17  Daksha called and spoke pt regarding after surgery care and scheduling of PO appt. Pt reports all is well and no complications after Sx. stj

## 2017-05-23 ENCOUNTER — OFFICE VISIT (OUTPATIENT)
Dept: OPHTHALMOLOGY | Facility: CLINIC | Age: 32
End: 2017-05-23
Payer: OTHER GOVERNMENT

## 2017-05-23 DIAGNOSIS — Z98.890 POST-OPERATIVE STATE: Primary | ICD-10-CM

## 2017-05-23 PROCEDURE — 99211 OFF/OP EST MAY X REQ PHY/QHP: CPT | Mod: PBBFAC | Performed by: OPHTHALMOLOGY

## 2017-05-23 PROCEDURE — 99024 POSTOP FOLLOW-UP VISIT: CPT | Mod: ,,, | Performed by: OPHTHALMOLOGY

## 2017-05-23 PROCEDURE — 99999 PR PBB SHADOW E&M-EST. PATIENT-LVL I: CPT | Mod: PBBFAC,,, | Performed by: OPHTHALMOLOGY

## 2017-05-23 NOTE — PROGRESS NOTES
HPI     DLS 4/25/17    Hx Strabismus --Esotropia   S/p  Biased resection and  Advancement MR OU 4-6mm ---Inferior Oblique   Myectomy OS   S/p Exploration, LR OU    Pt reports slight irritation to OU---feels like lemon juice in the   eyes-------denies double vision     POHx:   1. H/O Eye Sx OS    Last edited by Ariela Ghosh on 5/23/2017  9:12 AM. (History)            Assessment /Plan     For exam results, see Encounter Report.    Post-operative state      The patient has had the desired result of the surgical procedure.  RTC 1 mo

## 2017-06-20 ENCOUNTER — OFFICE VISIT (OUTPATIENT)
Dept: OPHTHALMOLOGY | Facility: CLINIC | Age: 32
End: 2017-06-20
Payer: OTHER GOVERNMENT

## 2017-06-20 DIAGNOSIS — Z98.890 POST-OPERATIVE STATE: Primary | ICD-10-CM

## 2017-06-20 PROCEDURE — 99212 OFFICE O/P EST SF 10 MIN: CPT | Mod: PBBFAC | Performed by: OPHTHALMOLOGY

## 2017-06-20 PROCEDURE — 99999 PR PBB SHADOW E&M-EST. PATIENT-LVL II: CPT | Mod: PBBFAC,,, | Performed by: OPHTHALMOLOGY

## 2017-06-20 PROCEDURE — 99024 POSTOP FOLLOW-UP VISIT: CPT | Mod: ,,, | Performed by: OPHTHALMOLOGY

## 2017-06-20 NOTE — PROGRESS NOTES
HPI     DLS 5/23/17    Hx Strabismus --Esotropia   S/p  Biased resection and  Advancement MR OU 4-6mm ---Inferior Oblique   Myectomy OS   S/p Exploration, LR OU    Pt still having slight irritation ---denies eye pain, flashes or floaters     POHx:   1. H/O Eye Sx OS    Gtts None     Last edited by Ariela Ghosh on 6/20/2017 10:12 AM. (History)            Assessment /Plan     For exam results, see Encounter Report.    Post-operative state      The patient has had the desired result of the surgical procedure.     This service was scribed by Cristy Cantu for, and in the presence of Dr Capone who personally performed this service.    Cristy Cantu, COA    Marimar Capone MD

## (undated) DEVICE — GLOVE BIOGEL SENSOR SZ 7.5

## (undated) DEVICE — DRESSING TRANS 2X2 TEGADERM

## (undated) DEVICE — CORD BIPOLAR 12 FOOT

## (undated) DEVICE — SUT 6/0 18IN COATED VICRYL

## (undated) DEVICE — FORCEP CURVED DISP

## (undated) DEVICE — SOL BETADINE 5%

## (undated) DEVICE — TRAY MUSCLE LID EYE

## (undated) DEVICE — SEE MEDLINE ITEM 157128

## (undated) DEVICE — GOWN SURGICAL X-LARGE

## (undated) DEVICE — SHEET EENT SPLIT